# Patient Record
Sex: FEMALE | URBAN - METROPOLITAN AREA
[De-identification: names, ages, dates, MRNs, and addresses within clinical notes are randomized per-mention and may not be internally consistent; named-entity substitution may affect disease eponyms.]

---

## 2018-04-18 ENCOUNTER — HOSPITAL ENCOUNTER (INPATIENT)
Facility: HOSPITAL | Age: 38
LOS: 8 days | Discharge: HOME | DRG: 885 | End: 2018-04-26
Attending: PSYCHIATRY & NEUROLOGY | Admitting: PSYCHIATRY & NEUROLOGY

## 2018-04-18 DIAGNOSIS — F25.0 SCHIZOAFFECTIVE DISORDER, BIPOLAR TYPE: Primary | ICD-10-CM

## 2018-04-18 DIAGNOSIS — R45.851 DEPRESSION WITH SUICIDAL IDEATION: ICD-10-CM

## 2018-04-18 DIAGNOSIS — F32.A DEPRESSION WITH SUICIDAL IDEATION: ICD-10-CM

## 2018-04-18 PROCEDURE — 11400000 HC PSYCH PRIVATE ROOM

## 2018-04-18 PROCEDURE — 25000003 PHARM REV CODE 250: Performed by: PSYCHIATRY & NEUROLOGY

## 2018-04-18 RX ORDER — HYDROXYZINE PAMOATE 50 MG/1
50 CAPSULE ORAL NIGHTLY PRN
Status: DISCONTINUED | OUTPATIENT
Start: 2018-04-18 | End: 2018-04-22

## 2018-04-18 RX ORDER — MAG HYDROX/ALUMINUM HYD/SIMETH 200-200-20
30 SUSPENSION, ORAL (FINAL DOSE FORM) ORAL EVERY 6 HOURS PRN
Status: DISCONTINUED | OUTPATIENT
Start: 2018-04-18 | End: 2018-04-26 | Stop reason: HOSPADM

## 2018-04-18 RX ORDER — FOLIC ACID 1 MG/1
1 TABLET ORAL DAILY
Status: DISCONTINUED | OUTPATIENT
Start: 2018-04-18 | End: 2018-04-26 | Stop reason: HOSPADM

## 2018-04-18 RX ORDER — LOPERAMIDE HYDROCHLORIDE 2 MG/1
2 CAPSULE ORAL
Status: DISCONTINUED | OUTPATIENT
Start: 2018-04-18 | End: 2018-04-26 | Stop reason: HOSPADM

## 2018-04-18 RX ORDER — ACETAMINOPHEN 325 MG/1
650 TABLET ORAL EVERY 6 HOURS PRN
Status: DISCONTINUED | OUTPATIENT
Start: 2018-04-18 | End: 2018-04-26 | Stop reason: HOSPADM

## 2018-04-18 RX ORDER — OLANZAPINE 10 MG/1
10 TABLET ORAL EVERY 8 HOURS PRN
Status: DISCONTINUED | OUTPATIENT
Start: 2018-04-18 | End: 2018-04-26 | Stop reason: HOSPADM

## 2018-04-18 RX ORDER — DOCUSATE SODIUM 100 MG/1
100 CAPSULE, LIQUID FILLED ORAL DAILY PRN
Status: DISCONTINUED | OUTPATIENT
Start: 2018-04-18 | End: 2018-04-26 | Stop reason: HOSPADM

## 2018-04-18 RX ORDER — OLANZAPINE 10 MG/2ML
10 INJECTION, POWDER, FOR SOLUTION INTRAMUSCULAR EVERY 8 HOURS PRN
Status: DISCONTINUED | OUTPATIENT
Start: 2018-04-18 | End: 2018-04-26 | Stop reason: HOSPADM

## 2018-04-18 RX ADMIN — THERA TABS 1 TABLET: TAB at 02:04

## 2018-04-18 RX ADMIN — FOLIC ACID 1 MG: 1 TABLET ORAL at 02:04

## 2018-04-18 RX ADMIN — HYDROXYZINE PAMOATE 50 MG: 50 CAPSULE ORAL at 09:04

## 2018-04-18 NOTE — PLAN OF CARE
"Problem: Overarching Goals (Adult)  Goal: Develops/Participates in Therapeutic [place] to Support Successful Transition    Intervention: Mutually Develop Transition Plan  Collateral Contact:    Spoke with patient's sister Leigh Keyes [phone]. She states patient came down came down from [place] where been with their mom. "Somehow she ended up in [place] (she does have a daughtert in [place]) , then went to [place]; and told me to come get her. All this traveling she's doing, she thinks someone is after her.    "She's like two people - she will be calm and then switch on you (usually when you dont respond the way she wants you to or agree with her).   Sister states she gets up for work at 5am - patient got up and started fussing. "She woke up hostile, it's like she's got a split personality. She just went to fussing. She's gotten way smaller than she usually is. I dont think she's eating much, but she did eat when she was here.      Normally you can have a conversation w her ; now she rambles through conversaitons; never finishes  what she was saying- and just switches on you.     We do have schizophrenia in our family. We have another sister with schizophrenia but she's-more calm and relaxed, she mostly eats and sleeps with a blank expression. She's doing better now- she can hold a conversation. At one time she thought she was Luiza (a peralta) , but we're past that. I also have an aunt who is totally out of it.     Sister states patient has been hospitalized before in [place]. I got my mom to take a picture of the bottle of medication. It was Latuda 80mg, but dated June 2 2017. My mom states hasnt been taking it.     Sister states patient got mad and left her house to go to the [facility]ure. Sister states last night she went to the [employer]  [facility] around 9pm and everything seemed ok with her. She was going to go to her cousin in [place]. She walked me back to the car. Now today this. She got picked up " by ambulance. I Think shes been having withdrawals of medicine. I  visited mom last April/may- she was saying not go outside someone would kidnap me and never see me again. It's been about a a year; seeing same traits with other sister and her.    She has kids she needs to look after. And that's another problem. Her kids dad took them and she doesn't know where they are and hasn't talked to their dad;   Sister state patient does smoke marijuana, but she doesn't know of anything else states patient left her mom at the beigining March.    Sister states patient is very paranoid and asked that we DO NOT not tell her we spoke.
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
## 2018-04-19 LAB
BACTERIA #/AREA URNS HPF: ABNORMAL /HPF
BILIRUB UR QL STRIP: NEGATIVE
CHOLEST SERPL-MCNC: 144 MG/DL
CHOLEST/HDLC SERPL: 2.7 {RATIO}
CLARITY UR: CLEAR
COLOR UR: YELLOW
GLUCOSE SERPL-MCNC: 84 MG/DL
GLUCOSE UR QL STRIP: NEGATIVE
HDLC SERPL-MCNC: 54 MG/DL
HDLC SERPL: 37.5 %
HGB UR QL STRIP: ABNORMAL
KETONES UR QL STRIP: NEGATIVE
LDLC SERPL CALC-MCNC: 79.2 MG/DL
LEUKOCYTE ESTERASE UR QL STRIP: ABNORMAL
MICROSCOPIC COMMENT: ABNORMAL
NITRITE UR QL STRIP: NEGATIVE
NONHDLC SERPL-MCNC: 90 MG/DL
PH UR STRIP: 6 [PH] (ref 5–8)
PROT UR QL STRIP: NEGATIVE
RBC #/AREA URNS HPF: 1 /HPF (ref 0–4)
SP GR UR STRIP: 1.02 (ref 1–1.03)
T4 FREE SERPL-MCNC: 0.99 NG/DL
TRIGL SERPL-MCNC: 54 MG/DL
TSH SERPL DL<=0.005 MIU/L-ACNC: 0.33 UIU/ML
URN SPEC COLLECT METH UR: ABNORMAL
UROBILINOGEN UR STRIP-ACNC: NEGATIVE EU/DL
WBC #/AREA URNS HPF: 4 /HPF (ref 0–5)

## 2018-04-19 PROCEDURE — 84439 ASSAY OF FREE THYROXINE: CPT

## 2018-04-19 PROCEDURE — 80061 LIPID PANEL: CPT

## 2018-04-19 PROCEDURE — 25000003 PHARM REV CODE 250: Performed by: PSYCHIATRY & NEUROLOGY

## 2018-04-19 PROCEDURE — 99223 1ST HOSP IP/OBS HIGH 75: CPT | Mod: ,,, | Performed by: PSYCHIATRY & NEUROLOGY

## 2018-04-19 PROCEDURE — 36415 COLL VENOUS BLD VENIPUNCTURE: CPT

## 2018-04-19 PROCEDURE — 11400000 HC PSYCH PRIVATE ROOM

## 2018-04-19 PROCEDURE — 81000 URINALYSIS NONAUTO W/SCOPE: CPT

## 2018-04-19 PROCEDURE — 99252 IP/OBS CONSLTJ NEW/EST SF 35: CPT | Mod: ,,, | Performed by: NURSE PRACTITIONER

## 2018-04-19 PROCEDURE — 84443 ASSAY THYROID STIM HORMONE: CPT

## 2018-04-19 PROCEDURE — 90833 PSYTX W PT W E/M 30 MIN: CPT | Mod: ,,, | Performed by: PSYCHIATRY & NEUROLOGY

## 2018-04-19 PROCEDURE — 82947 ASSAY GLUCOSE BLOOD QUANT: CPT

## 2018-04-19 RX ORDER — OLANZAPINE 10 MG/1
10 TABLET, ORALLY DISINTEGRATING ORAL NIGHTLY
Status: DISCONTINUED | OUTPATIENT
Start: 2018-04-19 | End: 2018-04-21

## 2018-04-19 RX ORDER — OLANZAPINE 5 MG/1
5 TABLET, ORALLY DISINTEGRATING ORAL DAILY
Status: DISCONTINUED | OUTPATIENT
Start: 2018-04-19 | End: 2018-04-21

## 2018-04-19 RX ADMIN — THERA TABS 1 TABLET: TAB at 08:04

## 2018-04-19 RX ADMIN — OLANZAPINE 10 MG: 10 TABLET, ORALLY DISINTEGRATING ORAL at 09:04

## 2018-04-19 RX ADMIN — HYDROXYZINE PAMOATE 50 MG: 50 CAPSULE ORAL at 10:04

## 2018-04-19 RX ADMIN — OLANZAPINE 5 MG: 5 TABLET, ORALLY DISINTEGRATING ORAL at 10:04

## 2018-04-19 RX ADMIN — FOLIC ACID 1 MG: 1 TABLET ORAL at 08:04
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
## 2018-04-19 NOTE — PLAN OF CARE
Problem: Patient Care Overview (Adult)  Goal: Plan of Care Review  Outcome: Ongoing (interventions implemented as appropriate)  Pt.  Slept 2  Hours  this shift. Pt. Verbalized having difficult time falling asleep even after taking Vistaril. Pt. Stated she would speak tot he doctor regarding her insomnia and did not wasn't additional med during the night. q 15 min safety checks done this shift. Safety and comfort maintained. Cont. Plan.

## 2018-04-19 NOTE — MEDICAL/APP STUDENT
PSYCHIATRY INPATIENT ADMISSION NOTE - H & P      4/19/2018 7:14 AM   Judy Keyes   1980   [id number]           DATE OF ADMISSION: 4/18/2018 12:54 PM    SITE: [facility]    CURRENT LEGAL STATUS: PEC and/or CEC      HISTORY    CHIEF COMPLAINT   Judy Keyes is a 37 y.o. female with a past psychiatric history of depression, anxiety, bipolar, schizophrenia, currently admitted to the inpatient unit with the following chief complaint: suicidal ideation. She has been seeing Dr. Diaz [place], who had prescribed her Latuda, 80 mg, last time she took any medications was a year ago, used to help her sleep at night. She didn't sleep well at all last night, had problems initiating and maintaining sleep, couldn't quantify.     Patient said yes to every question and states that she will say yes to any further questions. She was agitated and very distressed about her two kids, 2 y.o. And 4 y.o., who have been taken by her boyfriend who lives in [place], [place]. She wants some help in order to contact him or someone to make sure her kids are alright. She doesn't know his address or have any contact information but his full name is Seina Rosales. She's been homeless and seeks going to shelter after d/c. Can't give me any details on how long she's been unemployed and homeless.     [facility] [place] senia jess. Called suicide.     HPI   (Elements: Location, Quality, Severity, Duration, Timing, Content, Modifying Factors, Associated Signs & Symptoms)    The patient was seen and examined. The chart was reviewed.    The patient presented to the ER on *** with complaints of ***    The patient was medically cleared and admitted to the [facility].     ***       Symptoms of Depression: diminished mood or loss of interest/anhedonia; +irritability, +diminished energy, change in sleep, change in appetite, +diminished concentration or cognition or indecisiveness, PMA/R, +excessive guilt or hopelessness or  worthlessness, suicidal ideations    Sleep: initiation, maintenance, early morning awakening with inability to return to sleep    Suicidal/Homicidal ideations: active/passive ideations, organized plans, future intentions    Symptoms of psychosis: hallucinations, delusions, disorganized thinking, disorganized behavior or abnormal motor behavior, or negative symptoms (diminshed emotional expression, avolition, anhedonia, alogia, asociality      Symptoms of saran or hypomania: elevated, expansive, or irritable mood with increased energy or activity; with inflated self-esteem or grandiosity, decreased need for sleep, increased rate of speech, FOI or racing thoughts, distractibility, increased goal directed activity or PMA, risky/disinhibited behavior    Symptoms of KAYLIE: excessive anxiety/worry/fear, more days than not, about numerous issues, difficult to control, with restlessness, fatigue, poor concentration, irritability, muscle tension, sleep disturbance; causes functionally impairing distress     Symptoms of Panic Disorder: recurrent panic attacks, precipitated or un-precipitated, source of worry and/or behavioral changes secondary; with or without agoraphobia    Symptoms of PTSD: h/o trauma; re-experiencing/intrusive symptoms, avoidant behavior, negative alterations in cognition or mood, or hyperarousal symptoms; with or without dissociative symptoms     Symptoms of OCD: obsessions or compulsions     Symptoms of Eating Disorders: anorexia, bulimia or binging    Substance Use: intoxication, withdrawal, tolerance, used in larger amounts or duration than intended, unsuccessful attempts to limit or quit, increased time engaging in or seeking out, cravings or strong desire to use, failure to fulfill obligations, negative consequences in social/interpersonal/occupational,/recreational areas, use in dangerous situations, medical or psychological consequences         PSYCHOTHERAPY ADD-ON +[id number]   30 (16-37*)  minutes    Time: *** minutes  Participants: Met with patient    Therapeutic Intervention Type: behavior modifying psychotherapy, supportive psychotherapy  Why chosen therapy is appropriate versus another modality: relevant to diagnosis, patient responds to this modality, evidence based practice    Target symptoms: {PSY TARGET SYMPTOMS:35881}  Primary focus: ***  Psychotherapeutic techniques: ***    Outcome monitoring methods: self-report, observation    Patient's response to intervention:  The patient's response to intervention is {response:22719}.    Progress toward goals:  The patient's progress toward goals is {progress:47610}.            PAST PSYCHIATRIC HISTORY  Previous Psychiatric Hospitalizations: 2-3   Previous SI/HI: ***  Previous Suicide Attempts: ***   Previous Medication Trials: ***  Psychiatric Care (current & past): ***  History of Psychotherapy: ***  History of Violence: ***      SUBSTANCE ABUSE HISTORY   Tobacco: ***  Alcohol: ***  Illicit Substances: ***  Misuse of Prescription Medications: ***  Detoxes: ***  Rehabs: ***  12 Step Meetings: ***  Periods of Sobriety: ***  Withdrawal: ***        PAST MEDICAL & SURGICAL HISTORY   Past Medical History:   Diagnosis Date    Anxiety     Depression     History of psychiatric hospitalization     Hx of psychiatric care     Psychiatric problem     Therapy      No past surgical history on file.  ***    CURRENT MEDICATION REGIMEN   Home Meds:   Prior to Admission medications    Not on File       ***  OTC Meds: ***    Scheduled Meds:    folic acid  1 mg Oral Daily    multivitamin  1 tablet Oral Daily      PRN Meds: acetaminophen, aluminum-magnesium hydroxide-simethicone, docusate sodium, hydrOXYzine pamoate, loperamide, OLANZapine **AND** OLANZapine   Psychotherapeutics     Start     Stop Route Frequency Ordered    04/18/18 1306  OLANZapine tablet 10 mg  (Olanzapine)      -- Oral Every 8 hours PRN 04/18/18 1306    04/18/18 1306  OLANZapine injection 10 mg  " (Olanzapine)      -- IM Every 8 hours PRN 04/18/18 1306            ALLERGIES   Review of patient's allergies indicates:  No Known Allergies      NEUROLOGIC HISTORY  Seizures: ***   Head trauma: ***       FAMILY PSYCHIATRIC HISTORY   History reviewed. No pertinent family history.    ***       SOCIAL HISTORY  Developmental/Childhood: ***  History of Physical/Sexual Abuse: ***  Education: ***    Employment: ***   Financial: ***   Relationship Status/Sexual Orientation: ***   Children: ***   Housing Status: ***    Amish: ***   History: ***   Recreational Activities: ***  Access to Gun: ***       LEGAL HISTORY   Past Charges/Incarcerations:***   Pending Charges: ***      ROS  Reviewed note/exam by  *** from *** at ***        EXAMINATION      PHYSICAL EXAM  Reviewed note/exam by  *** from *** at ***    VITALS   Vitals:    04/18/18 2100   BP: 97/65   Pulse: 65   Resp: 19   Temp: 98.7 °F (37.1 °C)          PAIN  ***/10  Subjective report of pain matches objective signs and symptoms: {YES,NO, WILDCARD(MULTI):00796}      LABORATORY DATA   No results found for this or any previous visit (from the past 72 hour(s)).   No results found for: PHENYTOIN, PHENOBARB, VALPROATE, CBMZ        CONSTITUTIONAL  General Appearance: ***; NAD    MUSCULOSKELETAL  Muscle Strength and Tone: *** normal  Abnormal Involuntary Movements: *** none  Gait and Station: *** normal; non-ataxic    PSYCHIATRIC   Level of Consciousness: awake, alert  Orientation: p/p/t/s  Grooming: *** adequate to circumstances  Psychomotor Behavior: *** PMA/R  Speech: nl r/t/v/s  Language: *** English fluent  Mood: "***"  Affect: ***  Thought Process: *** linear and organized  Associations: *** intact; no CHELLY  Thought Content: *** denied AVH/delusions; denied HI/SI  Memory: *** intact to recent and remote events  Attention: *** intact to conversation; not distractible   Fund of Knowledge: *** age and education appropriate  Estimate if Intelligence: *** " average based on work/education history, vocabulary and mental status exam  Insight: *** good- seeks help  Judgment:  *** good- no bx issues, compliant and cooperative        PSYCHOSOCIAL      PSYCHOSOCIAL STRESSORS   { :74764}    FUNCTIONING RELATIONSHIPS   {Psychfxinrelationships:88689}      STRENGTHS AND LIABILITIES   {PSY STRENGTHS/LIABILITIES:57598}      Is the patient aware of the biomedical complications associated with substance abuse and mental illness? yes    Does the patient have an Advance Directive for Mental Health treatment? no  (If yes, inform patient to bring copy.)        ASSESSMENT     IMPRESSION   ***          MEDICAL DECISION MAKING        PROBLEM LIST AND MANAGEMENT PLANS    ***      PRESCRIPTION DRUG MANAGEMENT  Compliance: yes  Side Effects: no  Regimen Adjustments:   ***      DIAGNOSTIC TESTING  Labs reviewed; follow up pending labs; ***    Disposition:  - to assist with aftercare planning and collateral  -Once stable discharge home with outpatient follow up care and/or rehab  -Continue inpatient treatment under a PEC and/or CEC for danger to self, and danger to others, and grave disability as evident by ***      Bre Whipple MD  Psychiatry
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
## 2018-04-19 NOTE — PLAN OF CARE
Problem: Patient Care Overview (Adult)  Goal: Plan of Care Review  Outcome: Ongoing (interventions implemented as appropriate)  Pt anxious, cooperative, worried about her kids, denies SI or HI, safety precautions maintained will continue to monitor

## 2018-04-19 NOTE — PLAN OF CARE
"  Treatment Recommendation:   1:1 Intervention (as needed)    Reality Orientation Skilled Activity  Cognitive Stimulation Skilled Activity  Sensory Stimulation Skilled Activity  Creative Expression Skilled Activity  Self Expression Skilled Activity  Mild Exercises Skilled Activity  Stress Management Skilled Activity  Coping Skilled Activity  Leisure Education and Awareness Skilled Activity    Treatment Goal(s):  Long Term Goals Refer To Master Treatment Plan    Short Term Treatment Goal(s)  Patient Will:  Exhibit Improvement in Mood  Demonstrate Improvement in Thought Processes / Awareness  Demonstrate Constructive Expression of Feelings and Behavior  Identify at Least 2 Coping Skills or Leisure Skills to Reduce Depression and Hopelessness Upon Request from Therapist  Identify (+) Leisure / Recreation Activities that Promote Wellness and Promote a Sober Lifestyle    Discharge Recommendations:  Encourage Patient to Utilize Coping Skills on a Regular Basis to Reduce the Risk of Decompensating and Re-Hospitalizations  Follow Up with After Care Appointments  Continue with Current Leisure Activities     Patient presents with sleepy, irritable affect and "depressed, sleepy, tired" mood. Patient states "I don't like being around people because everything is recorded." Patient states her admit is due to "going through a stressful time in my life. Suicidal thoughts, depressed, sad. My head cloudy right now, life is complicated right now. Everything is wrong. I don't know which way to turn." Patient admits to negative leisure lifestyle of alcohol, marijuana and cigarettes. Patient reports she is single, has 6 children, GED/some college, unemployed, homeless(supported by family).   "
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
## 2018-04-20 PROCEDURE — 99233 SBSQ HOSP IP/OBS HIGH 50: CPT | Mod: ,,, | Performed by: PSYCHIATRY & NEUROLOGY

## 2018-04-20 PROCEDURE — 11400000 HC PSYCH PRIVATE ROOM

## 2018-04-20 PROCEDURE — 25000003 PHARM REV CODE 250: Performed by: PSYCHIATRY & NEUROLOGY

## 2018-04-20 RX ADMIN — OLANZAPINE 10 MG: 10 TABLET, ORALLY DISINTEGRATING ORAL at 08:04

## 2018-04-20 RX ADMIN — OLANZAPINE 5 MG: 5 TABLET, ORALLY DISINTEGRATING ORAL at 08:04

## 2018-04-20 RX ADMIN — FOLIC ACID 1 MG: 1 TABLET ORAL at 08:04

## 2018-04-20 RX ADMIN — HYDROXYZINE PAMOATE 50 MG: 50 CAPSULE ORAL at 09:04

## 2018-04-20 RX ADMIN — THERA TABS 1 TABLET: TAB at 08:04

## 2018-04-20 NOTE — PLAN OF CARE
Problem: Overarching Goals (Adult)  Goal: Optimized Coping Skills in Response to Life Stressors    Intervention: Promote Effective Coping Strategies  Group Note  Behavior:  Patient attended Psychotherapy Group and participated.     Intervention:  Resilience- patients answered True/False questions regarding resilience indicators. Discussed answers. Discussed definition of resilience, how they have dealt with stressful events and what they learned from that experience. Patient noted what makes them feel more hopeful.     Response:  Patient states she thinks he is resilient but has work to do.  Notes her stressful events have including childbirth, finding a job and halfway time.      Plan:  Will continue to encourage patient participation in group

## 2018-04-20 NOTE — PLAN OF CARE
"Problem: Overarching Goals (Adult)  Goal: Develops/Participates in Therapeutic [place] to Support Successful Transition    Intervention: Foster Therapeutic [place]  Patient declined PSA. Patient stated, "this process has been aggravating. I keep getting asked the same questions, and it doesn't seem to be going anywhere". Patient continued, "I don't want to answer any more questions, and I don't want to sign any papers. I am ready to go". "I am getting really aggravated up in here. How do I leave?"    Patient refused to sign for collateral.    Patient refused to sign safety plan.          "

## 2018-04-20 NOTE — PLAN OF CARE
Problem: Patient Care Overview (Adult)  Goal: Plan of Care Review  Outcome: Ongoing (interventions implemented as appropriate)  Shift note : patient states that she slept well last night and feels better . Difficult to arrange a follow up appointment as she keeps changing the town that she will be in . She is eating all meals and taking all ordered medications .
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
## 2018-04-20 NOTE — PROGRESS NOTES
"PSYCHIATRY DAILY INPATIENT PROGRESS NOTE  SUBSEQUENT HOSPITAL VISIT    ENCOUNTER DATE: 4/20/2018  SITE: [facility]    DATE OF ADMISSION: 4/18/2018 12:54 PM  LENGTH OF STAY: 2 days      HISTORY    CHIEF COMPLAINT   Judy Keyes is a 37 y.o. female, seen during daily sainz rounds on the inpatient unit.  Judy Keyes presents with the chief complaint of psychosis and saran    HPI   (Elements: Location, Quality, Severity, Duration, Timing, Content, Modifying Factors, Associated Signs & Symptoms)    The patient was seen and examined. The chart was reviewed.    Staff reports no behavioral or management issues.     The patient has been compliant with treatment. The patient denied any side effects.    Patient says the medicine is helping her think "clearer.  I dont know, the medicine just helped me."  She is fixated on finding her labs from the previous hospital which I reviewed yesterday.  She is much less manic today, much improved.  She spoke with her family members yesterday.      Improving Symptoms of Depression: less diminished mood or loss of interest/anhedonia; +irritability, +diminished energy, change in sleep, change in appetite, diminished concentration or cognition or indecisiveness, PMA/R, excessive guilt or hopelessness or worthlessness, suicidal ideations     Endorses Sleep: initiation, maintenance, early morning awakening with inability to return to sleep     For weeks she has not needed sleep.       Improving less Suicidal/Homicidal ideations: less active/passive ideations, organized plans, future intentions     She finds the medicine has helped with these thoughts     Continued but improving Symptoms of psychosis: +hallucinations, +delusions, somewhat disorganized thinking, disorganized behavior or abnormal motor behavior, or negative symptoms (diminshed emotional expression, avolition, anhedonia, alogia, asociality     Medicine making hallucinations less frequent and less intense.  She is " guarded and wont disclose what voices she hears.       Improving Symptoms of saran or hypomania: less elevated, less expansive, or irritable mood with increased energy or activity; less with inflated self-esteem or grandiosity, improving decreased need for sleep, +increased rate of speech, improving FOI or racing thoughts, improving distractibility, less increased goal directed activity or PMA, less risky/disinhibited behavior     Continued but improving Symptoms of KAYLIE: less excessive anxiety/worry/fear, more days than not, about numerous issues, difficult to control, with restlessness, fatigue, poor concentration, irritability, muscle tension, sleep disturbance; causes functionally impairing distress     ROS  General ROS: negative  Ophthalmic ROS: negative  ENT ROS: negative  Allergy and Immunology ROS: negative  Hematological and Lymphatic ROS: negative  Endocrine ROS: negative  Respiratory ROS: no cough, shortness of breath, or wheezing  Cardiovascular ROS: no chest pain or dyspnea on exertion  Gastrointestinal ROS: no abdominal pain, change in bowel habits, or black or bloody stools  Genito-Urinary ROS: no dysuria, trouble voiding, or hematuria  Musculoskeletal ROS: negative  Neurological ROS: no TIA or stroke symptoms  Dermatological ROS: negative    PAST MEDICAL HISTORY   Past Medical History:   Diagnosis Date    Anxiety     Depression     History of psychiatric hospitalization     Hx of psychiatric care     Psychiatric problem     Therapy            PSYCHOTROPIC MEDICATIONS   Scheduled Meds:   folic acid  1 mg Oral Daily    multivitamin  1 tablet Oral Daily    olanzapine zydis  10 mg Oral QHS    olanzapine zydis  5 mg Oral Daily     Continuous Infusions:  PRN Meds:.acetaminophen, aluminum-magnesium hydroxide-simethicone, docusate sodium, hydrOXYzine pamoate, loperamide, OLANZapine **AND** OLANZapine        EXAMINATION    VITALS   Vitals:    04/19/18 2106   BP: 121/66   Pulse: 77   Resp: 18   Temp:  "96.8 °F (36 °C)       CONSTITUTIONAL  General Appearance: NAD    NEUROLOGIC  Abnormal Involuntary Movements: no rigidity or spasticity  Gait and Station: normal non -ataxic    PSYCHIATRIC   Level of Consciousness: alert  Orientation: p/p/t/s  Grooming: in hospital gown, somewhat disheveled  Psychomotor Behavior: no PMA PMR  Speech: spontaneous r/r/r  Mood: "better"  Affect: pleasant, less elevated, calmer  Thought Process: less racing, more organized  Thought Content: less delusions and AH  Memory: intact to conversation  Attention: less distractible   Insight: intact - seeks and accepts care, recognizes mental illness improving  Judgment: intact - cooperative with care, no behavioral issues        DIAGNOSTIC TESTING   Laboratory Results  Recent Results (from the past 24 hour(s))   Urinalysis    Collection Time: 04/19/18  3:10 PM   Result Value Ref Range    Specimen UA Urine, Clean Catch     Color, UA Yellow Yellow, Straw, Ana M    Appearance, UA Clear Clear    pH, UA 6.0 5.0 - 8.0    Specific Gravity, UA 1.020 1.005 - 1.030    Protein, UA Negative Negative    Glucose, UA Negative Negative    Ketones, UA Negative Negative    Bilirubin (UA) Negative Negative    Occult Blood UA Trace (A) Negative    Nitrite, UA Negative Negative    Urobilinogen, UA Negative <2.0 EU/dL    Leukocytes, UA Trace (A) Negative   Urinalysis Microscopic    Collection Time: 04/19/18  3:10 PM   Result Value Ref Range    RBC, UA 1 0 - 4 /hpf    WBC, UA 4 0 - 5 /hpf    Bacteria, UA Many (A) None-Occ /hpf    Microscopic Comment SEE COMMENT          MEDICAL DECISION MAKING    DIAGNOSES  Schzoaffective Disorder bipolar type  KAYLIE  Nicotine Use Disorder  Marijuana Use disorder  Psychosocial stressors    PROBLEM LIST AND MANAGEMENT PLANS  Psychosis - zyprexa, counseling  Mood - zyprexa, counsleing  Anxiety - counseling (will consider clonazepam as adjunct treatment for saran and anxiety)  Marijuana - counseling   Nicotine Use - replacement and " counseling     Psychosocial stressors - obtain collateral for better idea of her child custody situation and proper disposition to possibly [place]    PRESCRIPTION DRUG MANAGEMENT  Compliance: yes  Side Effects: no  Regimen Adjustments:      4/19  Zyprexa 5mg Qday 10mgQHS    DISCHARGE PLANNING  -SW to assist with aftercare planning and collateral  -Once stable discharge home with outpatient follow up care and/or rehab  -Continue inpatient treatment under a PEC and/or CEC for danger to self, and danger to others, and grave disability as evident by saran and psychosis.      NEED FOR CONTINUED HOSPITALIZATION  Psychiatric illness continues to pose a potential threat to life or bodily function, of self or others, thereby requiring the need for continued inpatient psychiatric hospitalization: Yes    Protective inpatient pyschiatric hospitalization required while a safe disposition plan is enacted: Yes    Patient stabilized and ready for discharge from inpatient psychiatric unit: No      STAFF:   Jarret Quiroz MD  Psychiatry

## 2018-04-21 PROCEDURE — 99233 SBSQ HOSP IP/OBS HIGH 50: CPT | Mod: ,,, | Performed by: PSYCHIATRY & NEUROLOGY

## 2018-04-21 PROCEDURE — 25000003 PHARM REV CODE 250: Performed by: PSYCHIATRY & NEUROLOGY

## 2018-04-21 PROCEDURE — 11400000 HC PSYCH PRIVATE ROOM

## 2018-04-21 RX ORDER — DIVALPROEX SODIUM 500 MG/1
500 TABLET, FILM COATED, EXTENDED RELEASE ORAL 2 TIMES DAILY
Status: DISCONTINUED | OUTPATIENT
Start: 2018-04-21 | End: 2018-04-26 | Stop reason: HOSPADM

## 2018-04-21 RX ORDER — CLONAZEPAM 1 MG/1
1 TABLET ORAL 2 TIMES DAILY
Status: DISCONTINUED | OUTPATIENT
Start: 2018-04-21 | End: 2018-04-22

## 2018-04-21 RX ORDER — OLANZAPINE 10 MG/1
10 TABLET, ORALLY DISINTEGRATING ORAL DAILY
Status: DISCONTINUED | OUTPATIENT
Start: 2018-04-22 | End: 2018-04-22

## 2018-04-21 RX ADMIN — THERA TABS 1 TABLET: TAB at 08:04

## 2018-04-21 RX ADMIN — OLANZAPINE 15 MG: 10 TABLET, ORALLY DISINTEGRATING ORAL at 08:04

## 2018-04-21 RX ADMIN — DIVALPROEX SODIUM 500 MG: 500 TABLET, EXTENDED RELEASE ORAL at 11:04

## 2018-04-21 RX ADMIN — CLONAZEPAM 1 MG: 1 TABLET ORAL at 09:04

## 2018-04-21 RX ADMIN — OLANZAPINE 5 MG: 5 TABLET, ORALLY DISINTEGRATING ORAL at 08:04

## 2018-04-21 RX ADMIN — FOLIC ACID 1 MG: 1 TABLET ORAL at 08:04

## 2018-04-21 RX ADMIN — DIVALPROEX SODIUM 500 MG: 500 TABLET, EXTENDED RELEASE ORAL at 08:04

## 2018-04-21 RX ADMIN — CLONAZEPAM 1 MG: 1 TABLET ORAL at 08:04

## 2018-04-21 NOTE — PROGRESS NOTES
PSYCHIATRY DAILY INPATIENT PROGRESS NOTE  SUBSEQUENT HOSPITAL VISIT    ENCOUNTER DATE: 4/21/2018  [facility]    DATE OF ADMISSION: 4/18/2018 12:54 PM  LENGTH OF STAY: 3 days      HISTORY    CHIEF COMPLAINT   Judy Keyes is a 37 y.o. female, seen during daily sainz rounds on the inpatient unit.  Judy Keyes presents with the chief complaint of psychosis and saran    HPI   (Elements: Location, Quality, Severity, Duration, Timing, Content, Modifying Factors, Associated Signs & Symptoms)    The patient was seen and examined. The chart was reviewed.    Staff reports she is intrusive and angry    The patient has been compliant with treatment. The patient denied any side effects.    Patient did not sleep well last night.  She has been taking her medication.  She is elevated and irritable, speech is rapid.  She mentioned     Improving Symptoms of Depression: less diminished mood or loss of interest/anhedonia; +irritability, +diminished energy, change in sleep, change in appetite, diminished concentration or cognition or indecisiveness, PMA/R, excessive guilt or hopelessness or worthlessness, suicidal ideations     Endorses Sleep: initiation, maintenance, early morning awakening with inability to return to sleep     For weeks she has not needed sleep.       Improving less Suicidal/Homicidal ideations: less active/passive ideations, organized plans, future intentions     She finds the medicine has helped with these thoughts     Continued but improving Symptoms of psychosis: +hallucinations, +delusions, somewhat disorganized thinking, disorganized behavior or abnormal motor behavior, or negative symptoms (diminshed emotional expression, avolition, anhedonia, alogia, asociality     Medicine making hallucinations less frequent and less intense.  She is guarded and wont disclose what voices she hears.       Improving but continued Symptoms of saran or hypomania: less elevated, less expansive, or irritable mood  with increased energy or activity; less with inflated self-esteem or grandiosity, improving decreased need for sleep, +increased rate of speech, improving FOI or racing thoughts, improving distractibility, less increased goal directed activity or PMA, less risky/disinhibited behavior     Continued but improving Symptoms of KAYLIE: less excessive anxiety/worry/fear, more days than not, about numerous issues, difficult to control, with restlessness, fatigue, poor concentration, irritability, muscle tension, sleep disturbance; causes functionally impairing distress     ROS  General ROS: negative  Ophthalmic ROS: negative  ENT ROS: negative  Allergy and Immunology ROS: negative  Hematological and Lymphatic ROS: negative  Endocrine ROS: negative  Respiratory ROS: no cough, shortness of breath, or wheezing  Cardiovascular ROS: no chest pain or dyspnea on exertion  Gastrointestinal ROS: no abdominal pain, change in bowel habits, or black or bloody stools  Genito-Urinary ROS: no dysuria, trouble voiding, or hematuria  Musculoskeletal ROS: negative  Neurological ROS: no TIA or stroke symptoms  Dermatological ROS: negative    PAST MEDICAL HISTORY   Past Medical History:   Diagnosis Date    Anxiety     Depression     History of psychiatric hospitalization     Hx of psychiatric care     Psychiatric problem     Therapy            PSYCHOTROPIC MEDICATIONS   Scheduled Meds:   clonazePAM  1 mg Oral BID    folic acid  1 mg Oral Daily    multivitamin  1 tablet Oral Daily    [START ON 4/22/2018] olanzapine zydis  10 mg Oral Daily    olanzapine zydis  15 mg Oral QHS     Continuous Infusions:  PRN Meds:.acetaminophen, aluminum-magnesium hydroxide-simethicone, docusate sodium, hydrOXYzine pamoate, loperamide, OLANZapine **AND** OLANZapine        EXAMINATION    VITALS   Vitals:    04/21/18 0834   BP: 126/60   Pulse: 65   Resp: 18   Temp: 98.5 °F (36.9 °C)       CONSTITUTIONAL  General Appearance: NAD    NEUROLOGIC  Abnormal  "Involuntary Movements: no rigidity or spasticity  Gait and Station: normal non -ataxic    PSYCHIATRIC   Level of Consciousness: alert  Orientation: p/p/t/s  Grooming: in hospital gown, somewhat disheveled  Psychomotor Behavior: some PMA no PMR  Speech: spontaneous r/r/r  Mood: "better"  Affect: somewhat agitated, intense  Thought Process: racing,   Thought Content: +delusions and AH  Memory: intact to conversation  Attention: less distractible   Insight: intact - seeks and accepts care, recognizes mental illness improving  Judgment: intact - cooperative with care, no behavioral issues        DIAGNOSTIC TESTING   Laboratory Results  No results found for this or any previous visit (from the past 24 hour(s)).      MEDICAL DECISION MAKING    DIAGNOSES  Schzoaffective Disorder bipolar type  KAYLIE  Nicotine Use Disorder  Marijuana Use disorder  Psychosocial stressors    PROBLEM LIST AND MANAGEMENT PLANS  Psychosis - zyprexa, counseling  Mood - zyprexa, counsleing depakote clonazepam  Anxiety - counseling clonazepam  Marijuana - counseling   Nicotine Use - replacement and counseling     Psychosocial stressors - obtain collateral for better idea of her child custody situation and proper disposition to possibly [place]    PRESCRIPTION DRUG MANAGEMENT  Compliance: yes  Side Effects: no  Regimen Adjustments:      4/19  Zyprexa 5mg Qday 10mgQHS    4/21  Zyprexa 10mg QDay 15mg QHS  Clonazepam 1mg BID  Depakote 500mg BID (discussed pregnancy risk)    DISCHARGE PLANNING  -SW to assist with aftercare planning and collateral  -Once stable discharge home with outpatient follow up care and/or rehab  -Continue inpatient treatment under a PEC and/or CEC for danger to self, and danger to others, and grave disability as evident by saran and psychosis.      NEED FOR CONTINUED HOSPITALIZATION  Psychiatric illness continues to pose a potential threat to life or bodily function, of self or others, thereby requiring the need for continued " inpatient psychiatric hospitalization: Yes    Protective inpatient pyschiatric hospitalization required while a safe disposition plan is enacted: Yes    Patient stabilized and ready for discharge from inpatient psychiatric unit: No      STAFF:   Jarret Quiroz MD  Psychiatry

## 2018-04-21 NOTE — PLAN OF CARE
Problem: Patient Care Overview (Adult)  Goal: Plan of Care Review  Outcome: Ongoing (interventions implemented as appropriate)  Up and about the unit.  Less manic this evening.  Denies SI at this time.  Endorses vague AH but will not say what the voices are saying.  Showered and ate snack this evening.  Interacting appropriately with peers and staff.  Safety checks ongoing.

## 2018-04-21 NOTE — PLAN OF CARE
Problem: Overarching Goals (Adult)  Goal: Develops/Participates in Therapeutic [place] to Support Successful Transition    Intervention: Foster Therapeutic [place]  Group Note:    Behavior:  Pt was not in attendance for group today. She was in the restroom when group was initiated.             Plan:  Encourage continued participation in therapeutic activities.

## 2018-04-21 NOTE — PLAN OF CARE
Problem: Patient Care Overview (Adult)  Goal: Plan of Care Review  Outcome: Ongoing (interventions implemented as appropriate)  Pt paranoid about boyfriend stalking her, ptblaming others, and with preoccupation. Pt denies depression, SI/HI, AH/VH. Pt says she has anxiety thinking about her kids while being here. Pt made several phone calls today and got loud during them, but not as loud as yesterday. Pt was polite and not rude today like yesterday. VSS. Accepting meals and snacks. Medication compliant. Will continue to monitor pt.

## 2018-04-22 PROCEDURE — 99233 SBSQ HOSP IP/OBS HIGH 50: CPT | Mod: ,,, | Performed by: PSYCHIATRY & NEUROLOGY

## 2018-04-22 PROCEDURE — 11400000 HC PSYCH PRIVATE ROOM

## 2018-04-22 PROCEDURE — 25000003 PHARM REV CODE 250: Performed by: PSYCHIATRY & NEUROLOGY

## 2018-04-22 PROCEDURE — S4991 NICOTINE PATCH NONLEGEND: HCPCS | Performed by: PSYCHIATRY & NEUROLOGY

## 2018-04-22 RX ORDER — OLANZAPINE 5 MG/1
5 TABLET, ORALLY DISINTEGRATING ORAL DAILY
Status: DISCONTINUED | OUTPATIENT
Start: 2018-04-23 | End: 2018-04-23

## 2018-04-22 RX ORDER — CLONAZEPAM 0.5 MG/1
0.5 TABLET ORAL DAILY
Status: DISCONTINUED | OUTPATIENT
Start: 2018-04-23 | End: 2018-04-26 | Stop reason: HOSPADM

## 2018-04-22 RX ORDER — HYDROXYZINE PAMOATE 50 MG/1
50 CAPSULE ORAL EVERY 6 HOURS PRN
Status: DISCONTINUED | OUTPATIENT
Start: 2018-04-22 | End: 2018-04-26 | Stop reason: HOSPADM

## 2018-04-22 RX ORDER — IBUPROFEN 200 MG
1 TABLET ORAL DAILY PRN
Status: DISCONTINUED | OUTPATIENT
Start: 2018-04-22 | End: 2018-04-26 | Stop reason: HOSPADM

## 2018-04-22 RX ORDER — CLONAZEPAM 1 MG/1
1 TABLET ORAL NIGHTLY
Status: DISCONTINUED | OUTPATIENT
Start: 2018-04-22 | End: 2018-04-26 | Stop reason: HOSPADM

## 2018-04-22 RX ADMIN — DIVALPROEX SODIUM 500 MG: 500 TABLET, EXTENDED RELEASE ORAL at 08:04

## 2018-04-22 RX ADMIN — OLANZAPINE 10 MG: 10 TABLET, ORALLY DISINTEGRATING ORAL at 08:04

## 2018-04-22 RX ADMIN — CLONAZEPAM 1 MG: 1 TABLET ORAL at 08:04

## 2018-04-22 RX ADMIN — NICOTINE 1 PATCH: 14 PATCH, EXTENDED RELEASE TRANSDERMAL at 05:04

## 2018-04-22 RX ADMIN — HYDROXYZINE PAMOATE 50 MG: 50 CAPSULE ORAL at 08:04

## 2018-04-22 RX ADMIN — THERA TABS 1 TABLET: TAB at 08:04

## 2018-04-22 RX ADMIN — OLANZAPINE 15 MG: 10 TABLET, ORALLY DISINTEGRATING ORAL at 08:04

## 2018-04-22 RX ADMIN — FOLIC ACID 1 MG: 1 TABLET ORAL at 08:04

## 2018-04-22 RX ADMIN — HYDROXYZINE PAMOATE 50 MG: 50 CAPSULE ORAL at 02:04

## 2018-04-22 NOTE — PLAN OF CARE
Problem: Patient Care Overview (Adult)  Goal: Plan of Care Review  Outcome: Ongoing (interventions implemented as appropriate)  Pt in day room most of shift and when she is not she is pacing the halls. Pt restless. Pt frequently seeks out staff and at nurses station. Pt paranoid, denies responsibility, blaming other, preoccupied. Pt states anxiety and was given vistaril which was effective and nicotine patch was given when requested. Pt denies depression, SI/HI, AH/VH. HR elevated and had to be taken manually. Will continue to monitor pt.

## 2018-04-22 NOTE — PLAN OF CARE
Problem: Patient Care Overview (Adult)  Goal: Plan of Care Review  Outcome: Ongoing (interventions implemented as appropriate)  Pt.  Slept 8  Hours  this shift without problems noted. q 15 min safety checks done this shift. Safety and comfort maintained. Cont. Plan.

## 2018-04-22 NOTE — PSYCH
"Pt was heard by staff saying "she wants to kill her boyfriend by stabbing him in the jugular and then pleading insanity". Pt told staff he is holding her 3 kids b/c he wants to be a family now and she wants them back so she can live in [place] with them.   "

## 2018-04-22 NOTE — PLAN OF CARE
"Problem: Overarching Goals (Adult)  Goal: Develops/Participates in Therapeutic [place] to Support Successful Transition    Intervention: Foster Therapeutic [place]  Group Note:    Behavior:  Pt attended group today dressed in hospital scrubs. Mood - improved.             Intervention:  The LMSW facilitated a discussion on Gratitude and the benefits of being grateful. Patients' were encourage to reflect on the things they are thankful for and make a "gratitude list".             Response:  Pt shared she is grateful for the following:  "life, children, jobs, mom food and friends". Pt reported she and her mom have had their differences in the past but "she's still my mom" and I am grateful for her.         Plan:  Encourage continued participation in therapeutic activities.                          "

## 2018-04-22 NOTE — PLAN OF CARE
"Problem: Patient Care Overview (Adult)  Goal: Plan of Care Review  Outcome: Ongoing (interventions implemented as appropriate)  Visible in the milieu.  Spoke on the phone multiple times this evening.  No angry outburst while speaking on the phone. Interacting appropriately with peers.  Less manic, more organized this evening.  Started on depakote and klonopin today.  Pt stated that she is feeling better today and misses her children but she realizes that she needs to get herself "straight" first.  Eating and drinking water adequately.  Safety checks ongoing.      "

## 2018-04-22 NOTE — PROGRESS NOTES
"PSYCHIATRY DAILY INPATIENT PROGRESS NOTE  SUBSEQUENT HOSPITAL VISIT    ENCOUNTER DATE: 4/22/2018  SITE: [facility]    DATE OF ADMISSION: 4/18/2018 12:54 PM  LENGTH OF STAY: 4 days      HISTORY    CHIEF COMPLAINT   Judy Keyes is a 37 y.o. female, seen during daily sainz rounds on the inpatient unit.  Judy Keyes presents with the chief complaint of psychosis and saran    HPI   (Elements: Location, Quality, Severity, Duration, Timing, Content, Modifying Factors, Associated Signs & Symptoms)    The patient was seen and examined. The chart was reviewed.    Staff reports improvement in behavior.      The patient has been compliant with treatment. The patient denied any side effects.    She slept well last night.  She says the medicine is really helping her today.  She is able to see "to the right path".        Improving Symptoms of Depression: less diminished mood or loss of interest/anhedonia; less irritability, improved diminished energy, change in sleep, change in appetite, diminished concentration or cognition or indecisiveness, PMA/R, excessive guilt or hopelessness or worthlessness, suicidal ideations     Improved Sleep: initiation, maintenance, early morning awakening with inability to return to sleep     Significantly improved sleep, slept eight hours last night     Improving less Suicidal/Homicidal ideations: less active/passive ideations, organized plans, future intentions     She finds the medicine has helped with these thoughts     Improving Symptoms of psychosis: less hallucinations, less delusions, somewhat disorganized thinking, disorganized behavior or abnormal motor behavior, or negative symptoms (diminshed emotional expression, avolition, anhedonia, alogia, asociality     Medicine making hallucinations less frequent and less intense.  She is guarded and wont disclose what voices she hears.       Her voices are improving and are positive in nature.      Improving but continued Symptoms of " saran or hypomania: less elevated, less expansive, or irritable mood with increased energy or activity; less with inflated self-esteem or grandiosity, improving decreased need for sleep, improving increased rate of speech, improving FOI or racing thoughts, improving distractibility, less increased goal directed activity or PMA, less risky/disinhibited behavior     Improving Symptoms of KAYLIE: less excessive anxiety/worry/fear, more days than not, about numerous issues, difficult to control, with restlessness, fatigue, poor concentration, irritability, muscle tension, sleep disturbance; causes functionally impairing distress     ROS  General ROS: negative  Ophthalmic ROS: negative  ENT ROS: negative  Allergy and Immunology ROS: negative  Hematological and Lymphatic ROS: negative  Endocrine ROS: negative  Respiratory ROS: no cough, shortness of breath, or wheezing  Cardiovascular ROS: no chest pain or dyspnea on exertion  Gastrointestinal ROS: no abdominal pain, change in bowel habits, or black or bloody stools  Genito-Urinary ROS: no dysuria, trouble voiding, or hematuria  Musculoskeletal ROS: negative  Neurological ROS: no TIA or stroke symptoms  Dermatological ROS: negative    PAST MEDICAL HISTORY   Past Medical History:   Diagnosis Date    Anxiety     Depression     History of psychiatric hospitalization     Hx of psychiatric care     Psychiatric problem     Therapy            PSYCHOTROPIC MEDICATIONS   Scheduled Meds:   clonazePAM  1 mg Oral BID    divalproex ER  500 mg Oral BID    folic acid  1 mg Oral Daily    multivitamin  1 tablet Oral Daily    olanzapine zydis  10 mg Oral Daily    olanzapine zydis  15 mg Oral QHS     Continuous Infusions:  PRN Meds:.acetaminophen, aluminum-magnesium hydroxide-simethicone, docusate sodium, hydrOXYzine pamoate, loperamide, OLANZapine **AND** OLANZapine        EXAMINATION    VITALS   Vitals:    04/22/18 0818   BP: 125/85   Pulse: 76   Resp: 18   Temp: 97.2 °F (36.2  "°C)       CONSTITUTIONAL  General Appearance: NAD    NEUROLOGIC  Abnormal Involuntary Movements: no rigidity or spasticity  Gait and Station: normal non -ataxic    PSYCHIATRIC   Level of Consciousness: awake but somewhat drowsy  Orientation: p/p/t/s  Grooming: in hospital gown, somewhat disheveled  Psychomotor Behavior: no PMA no PMR  Speech: spontaneous r/r/r  Mood: "better"  Affect: sedated  Thought Process: no longer racing   Thought Content: no delusions and AH  Memory: intact to conversation  Attention: not distractible   Insight: intact - seeks and accepts care, recognizes mental illness improving  Judgment: intact - cooperative with care, no behavioral issues        DIAGNOSTIC TESTING   Laboratory Results  No results found for this or any previous visit (from the past 24 hour(s)).      MEDICAL DECISION MAKING    DIAGNOSES  Schzoaffective Disorder bipolar type  KAYLIE  Nicotine Use Disorder  Marijuana Use disorder  Psychosocial stressors    PROBLEM LIST AND MANAGEMENT PLANS  Psychosis - zyprexa, counseling  Mood - zyprexa, counsleing depakote clonazepam  Anxiety - counseling clonazepam  Marijuana - counseling   Nicotine Use - replacement and counseling     Psychosocial stressors - obtain collateral for better idea of her child custody situation and proper disposition to possibly [place]    PRESCRIPTION DRUG MANAGEMENT  Compliance: yes  Side Effects: no  Regimen Adjustments:      4/19  Zyprexa 5mg Qday 10mgQHS    4/21  Zyprexa 10mg QDay 15mg QHS  Clonazepam 1mg BID  Depakote 500mg BID (discussed pregnancy risk)    4/22  Zyprexa 5mg QDay and Clonazepam 0.5mg QDay     DISCHARGE PLANNING  -SW to assist with aftercare planning and collateral  -Once stable discharge home with outpatient follow up care and/or rehab  -Continue inpatient treatment under a PEC and/or CEC for danger to self, and danger to others, and grave disability as evident by saran and psychosis.      NEED FOR CONTINUED HOSPITALIZATION  Psychiatric " illness continues to pose a potential threat to life or bodily function, of self or others, thereby requiring the need for continued inpatient psychiatric hospitalization: Yes    Protective inpatient pyschiatric hospitalization required while a safe disposition plan is enacted: Yes    Patient stabilized and ready for discharge from inpatient psychiatric unit: No      STAFF:   Jarret Quiroz MD  Psychiatry

## 2018-04-23 PROCEDURE — 25000003 PHARM REV CODE 250: Performed by: PSYCHIATRY & NEUROLOGY

## 2018-04-23 PROCEDURE — S4991 NICOTINE PATCH NONLEGEND: HCPCS | Performed by: PSYCHIATRY & NEUROLOGY

## 2018-04-23 PROCEDURE — 99233 SBSQ HOSP IP/OBS HIGH 50: CPT | Mod: ,,, | Performed by: PSYCHIATRY & NEUROLOGY

## 2018-04-23 PROCEDURE — 11400000 HC PSYCH PRIVATE ROOM

## 2018-04-23 PROCEDURE — 90833 PSYTX W PT W E/M 30 MIN: CPT | Mod: ,,, | Performed by: PSYCHIATRY & NEUROLOGY

## 2018-04-23 RX ORDER — OLANZAPINE 5 MG/1
5 TABLET ORAL DAILY
Status: DISCONTINUED | OUTPATIENT
Start: 2018-04-24 | End: 2018-04-26 | Stop reason: HOSPADM

## 2018-04-23 RX ADMIN — DIVALPROEX SODIUM 500 MG: 500 TABLET, EXTENDED RELEASE ORAL at 08:04

## 2018-04-23 RX ADMIN — FOLIC ACID 1 MG: 1 TABLET ORAL at 08:04

## 2018-04-23 RX ADMIN — THERA TABS 1 TABLET: TAB at 08:04

## 2018-04-23 RX ADMIN — OLANZAPINE 15 MG: 5 TABLET, FILM COATED ORAL at 08:04

## 2018-04-23 RX ADMIN — OLANZAPINE 5 MG: 5 TABLET, ORALLY DISINTEGRATING ORAL at 08:04

## 2018-04-23 RX ADMIN — CLONAZEPAM 0.5 MG: 0.5 TABLET ORAL at 08:04

## 2018-04-23 RX ADMIN — HYDROXYZINE PAMOATE 50 MG: 50 CAPSULE ORAL at 10:04

## 2018-04-23 RX ADMIN — NICOTINE 1 PATCH: 14 PATCH, EXTENDED RELEASE TRANSDERMAL at 05:04

## 2018-04-23 RX ADMIN — CLONAZEPAM 1 MG: 1 TABLET ORAL at 08:04

## 2018-04-23 NOTE — NURSING
"Received pt. Out and and about on the unit, noted frq. On phone. Loud at times but redirectable. Report mood as, " good, better". T.P. Noted  logical and more organized. Cont. Plan.  "

## 2018-04-23 NOTE — PLAN OF CARE
Problem: Patient Care Overview (Adult)  Goal: Plan of Care Review  Outcome: Ongoing (interventions implemented as appropriate)  Pt.  Slept 6.5  Hours  this shift without problems noted. q 15 min safety checks done this shift. Safety and comfort maintained. Cont. Plan.

## 2018-04-23 NOTE — PLAN OF CARE
Problem: Patient Care Overview (Adult)  Goal: Plan of Care Review  Outcome: Ongoing (interventions implemented as appropriate)  Shift note : patient is in the day room with staff and peers . She is taking all ordered medications and eating all meals . She will listen to other patients requests and then asks for the same things .

## 2018-04-23 NOTE — PLAN OF CARE
"Problem: Overarching Goals (Adult)  Goal: Develops/Participates in Therapeutic [place] to Support Successful Transition    Intervention: Foster Therapeutic Section  Attempted to complete assessment with patient. Patient pleasant but uncooperative. States she is feeling better and just needs to know when she will be discharged. "Just put yes for everything, don't ask me all that."  Patient states she plans to go back to [place] and [facility].  Patient states she plans to fly. Asked about discharging her to family in [place]. States she doesn't get along with her sister in [place], and her children's grandmother is in [place] but is too overcrowded. Patient complaining of menstrual cramps.         "

## 2018-04-23 NOTE — PROGRESS NOTES
"PSYCHIATRY DAILY INPATIENT PROGRESS NOTE  SUBSEQUENT HOSPITAL VISIT    ENCOUNTER DATE: 4/23/2018  SITE: [facility]    DATE OF ADMISSION: 4/18/2018 12:54 PM  LENGTH OF STAY: 5 days      HISTORY    CHIEF COMPLAINT   Judy Keyes is a 37 y.o. female, seen during daily sainz rounds on the inpatient unit.  Judy Keyes presents with the chief complaint of psychosis and saran    HPI   (Elements: Location, Quality, Severity, Duration, Timing, Content, Modifying Factors, Associated Signs & Symptoms)    The patient was seen and examined. The chart was reviewed.    Staff reports improvement in behavior.      The patient has been compliant with treatment. The patient denied any side effects.    She again slept well last night- slept 7 hours last night.      Improving Symptoms of Depression: less diminished mood or loss of interest/anhedonia; less irritability, improved diminished energy, change in sleep, change in appetite, diminished concentration or cognition or indecisiveness, PMA/R, and excessive guilt or hopelessness or worthlessness; no suicidal ideations     Improved Sleep: no current trouble initiation, maintenance, or early morning awakening with inability to return to sleep     Improving/resolved Suicidal/Homicidal ideations: no active/passive ideations, organized plans, future intentions; she is more future oriented and looking to get "back to [place]" and to be with her mother and children     Improving Symptoms of psychosis: no hallucinations, denied delusions, no disorganized thinking, disorganized behavior or abnormal motor behavior, or negative symptoms     Improving Symptoms of saran or hypomania: less elevated, less expansive, and less irritable mood with no increased energy or activity; no inflated self-esteem or grandiosity, no decreased need for sleep, improving increased rate of speech, noFOI or racing thoughts, no distractibility, no increased goal directed activity or PMA, no " risky/disinhibited behavior     Improved Symptoms of KAYLIE: less excessive anxiety/worry/fear, with less restlessness, fatigue, poor concentration, irritability, muscle tension, and sleep disturbance    PSYCHOTHERAPY ADD-ON +[id number]   30 (16-37*) minutes    Time: 16 minutes  Participants: Met with patient    Therapeutic Intervention Type: insight oriented psychotherapy, behavior modifying psychotherapy, supportive psychotherapy  Why chosen therapy is appropriate versus another modality: relevant to diagnosis, patient responds to this modality, evidence based practice    Target symptoms: mood disorder, psychosis  Primary focus: psychosocial stressors  Psychotherapeutic techniques: supportive techniques; psycho-education    Outcome monitoring methods: self-report, observation    Patient's response to intervention:  The patient's response to intervention is accepting.    Progress toward goals:  The patient's progress toward goals is fair .           ROS  General ROS: negative  Ophthalmic ROS: negative  ENT ROS: negative  Allergy and Immunology ROS: negative  Hematological and Lymphatic ROS: negative  Endocrine ROS: negative  Respiratory ROS: no cough, shortness of breath, or wheezing  Cardiovascular ROS: no chest pain or dyspnea on exertion  Gastrointestinal ROS: no abdominal pain, change in bowel habits, or black or bloody stools  Genito-Urinary ROS: no dysuria, trouble voiding, or hematuria  Musculoskeletal ROS: negative  Neurological ROS: no TIA or stroke symptoms  Dermatological ROS: negative    PAST MEDICAL HISTORY   Past Medical History:   Diagnosis Date    Anxiety     Depression     History of psychiatric hospitalization     Hx of psychiatric care     Psychiatric problem     Suicide attempt     Therapy            PSYCHOTROPIC MEDICATIONS   Scheduled Meds:   clonazePAM  0.5 mg Oral Daily    clonazePAM  1 mg Oral QHS    divalproex ER  500 mg Oral BID    folic acid  1 mg Oral Daily    multivitamin  1 tablet  "Oral Daily    OLANZapine  15 mg Oral QHS    [START ON 4/24/2018] OLANZapine  5 mg Oral Daily     Continuous Infusions:  PRN Meds:.acetaminophen, aluminum-magnesium hydroxide-simethicone, docusate sodium, hydrOXYzine pamoate, loperamide, nicotine, OLANZapine **AND** OLANZapine        EXAMINATION    VITALS   Vitals:    04/23/18 1451   BP: 114/74   Pulse: 107   Resp: 20   Temp: 96.6 °F (35.9 °C)       CONSTITUTIONAL  General Appearance: NAD    NEUROLOGIC  Abnormal Involuntary Movements: no rigidity or spasticity  Gait and Station: normal, non -ataxic    PSYCHIATRIC   Level of Consciousness: awake, alert  Orientation: p/p/t/s  Grooming: in hospital gown, somewhat disheveled  Psychomotor Behavior: no PMA no PMR  Speech: spontaneous, nl r/t/v  Mood: "better today"  Affect: improving range, less sedated  Thought Process: more linear and organized  Thought Content: no delusions, denied VH and AH, denied HI/SI  Memory: intact to conversation; intact to recent and remote events  Attention: not distractible; intact to conversation  Insight: intact/improved - seeks and accepts care, recognizes mental illness improving  Judgment: intact/improved - cooperative with care, no behavioral issues        DIAGNOSTIC TESTING   Laboratory Results  No results found for this or any previous visit (from the past 24 hour(s)).      MEDICAL DECISION MAKING    DIAGNOSES  Schzoaffective Disorder bipolar type  KAYLIE  Nicotine Use Disorder  Marijuana Use disorder  Psychosocial stressors    PROBLEM LIST AND MANAGEMENT PLANS  Psychosis - zyprexa, counseling  Mood - zyprexa, counsleing depakote clonazepam  Anxiety - counseling clonazepam  Marijuana - counseling   Nicotine Use - replacement and counseling     Psychosocial stressors - obtain collateral for better idea of her child custody situation and proper disposition to [facility]    PRESCRIPTION DRUG MANAGEMENT  Compliance: yes  Side Effects: no  Regimen Adjustments:      4/19  Zyprexa 5mg " Qday 10mgQHS    4/21  Zyprexa 10mg QDay 15mg QHS  Clonazepam 1mg BID  Depakote 500mg BID (discussed pregnancy risk)    4/22  Zyprexa 5mg QDay and Clonazepam 0.5mg QDay     4/23  Pt compliant with medications- changed from Zydis to tablet; checl VPA, CBC and CMP in the AM    DISCHARGE PLANNING  -SW to assist with aftercare planning and collateral  -Once stable discharge home with outpatient follow up care and/or rehab  -Continue inpatient treatment under a PEC and/or CEC for danger to self, and danger to others, and grave disability as evident by saran and psychosis.Patient is improving and should be stable for discharge in the next 2 days- anticipate stability by Wednesday.     NEED FOR CONTINUED HOSPITALIZATION  Psychiatric illness continues to pose a potential threat to life or bodily function, of self or others, thereby requiring the need for continued inpatient psychiatric hospitalization: Yes    Protective inpatient pyschiatric hospitalization required while a safe disposition plan is enacted: Yes    Patient stabilized and ready for discharge from inpatient psychiatric unit: No      STAFF:   Vern Ramirez MD  Psychiatry

## 2018-04-23 NOTE — PLAN OF CARE
Problem: Overarching Goals (Adult)  Goal: Optimized Coping Skills in Response to Life Stressors    Intervention: Promote Effective Coping Strategies  Group Note    Behavior:  Patient attended Psychotherapy Group. Patient did not sit with the group and left before the session was over.     Intervention:  Self management - Coping Skills. Processed with patients self management worksheet and different ways to cope with stressful situations.     Response:  Patient did not participate in the discussion. Patient noted on the hand out she can commit to walking 20 minutes a day, spending time with her son and practicing relaxing.     Plan:  Will continue to encourage patient participation in group.

## 2018-04-24 LAB
ALBUMIN SERPL BCP-MCNC: 3.5 G/DL
ALP SERPL-CCNC: 86 U/L
ALT SERPL W/O P-5'-P-CCNC: 24 U/L
ANION GAP SERPL CALC-SCNC: 8 MMOL/L
AST SERPL-CCNC: 21 U/L
BASOPHILS # BLD AUTO: 0.02 K/UL
BASOPHILS NFR BLD: 0.3 %
BILIRUB SERPL-MCNC: 0.6 MG/DL
BUN SERPL-MCNC: 10 MG/DL
CALCIUM SERPL-MCNC: 9.1 MG/DL
CHLORIDE SERPL-SCNC: 107 MMOL/L
CO2 SERPL-SCNC: 25 MMOL/L
CREAT SERPL-MCNC: 0.7 MG/DL
DIFFERENTIAL METHOD: ABNORMAL
EOSINOPHIL # BLD AUTO: 0.7 K/UL
EOSINOPHIL NFR BLD: 10 %
ERYTHROCYTE [DISTWIDTH] IN BLOOD BY AUTOMATED COUNT: 13.9 %
EST. GFR  (AFRICAN AMERICAN): >60 ML/MIN/1.73 M^2
EST. GFR  (NON AFRICAN AMERICAN): >60 ML/MIN/1.73 M^2
GLUCOSE SERPL-MCNC: 79 MG/DL
HCT VFR BLD AUTO: 38.6 %
HGB BLD-MCNC: 12.8 G/DL
LYMPHOCYTES # BLD AUTO: 3.5 K/UL
LYMPHOCYTES NFR BLD: 49.5 %
MCH RBC QN AUTO: 30.6 PG
MCHC RBC AUTO-ENTMCNC: 33.2 G/DL
MCV RBC AUTO: 92 FL
MONOCYTES # BLD AUTO: 0.5 K/UL
MONOCYTES NFR BLD: 7.1 %
NEUTROPHILS # BLD AUTO: 2.3 K/UL
NEUTROPHILS NFR BLD: 33.1 %
PLATELET # BLD AUTO: 184 K/UL
PMV BLD AUTO: 12.4 FL
POTASSIUM SERPL-SCNC: 4.5 MMOL/L
PROT SERPL-MCNC: 6.6 G/DL
RBC # BLD AUTO: 4.18 M/UL
SODIUM SERPL-SCNC: 140 MMOL/L
VALPROATE SERPL-MCNC: 71 UG/ML
WBC # BLD AUTO: 7.03 K/UL

## 2018-04-24 PROCEDURE — 80164 ASSAY DIPROPYLACETIC ACD TOT: CPT

## 2018-04-24 PROCEDURE — 85025 COMPLETE CBC W/AUTO DIFF WBC: CPT

## 2018-04-24 PROCEDURE — 99233 SBSQ HOSP IP/OBS HIGH 50: CPT | Mod: ,,, | Performed by: PSYCHIATRY & NEUROLOGY

## 2018-04-24 PROCEDURE — 11400000 HC PSYCH PRIVATE ROOM

## 2018-04-24 PROCEDURE — 36415 COLL VENOUS BLD VENIPUNCTURE: CPT

## 2018-04-24 PROCEDURE — 80053 COMPREHEN METABOLIC PANEL: CPT

## 2018-04-24 PROCEDURE — 25000003 PHARM REV CODE 250: Performed by: PSYCHIATRY & NEUROLOGY

## 2018-04-24 RX ORDER — OLANZAPINE 10 MG/1
20 TABLET ORAL NIGHTLY
Status: DISCONTINUED | OUTPATIENT
Start: 2018-04-24 | End: 2018-04-26 | Stop reason: HOSPADM

## 2018-04-24 RX ORDER — ONDANSETRON 4 MG/1
4 TABLET, ORALLY DISINTEGRATING ORAL ONCE
Status: DISCONTINUED | OUTPATIENT
Start: 2018-04-24 | End: 2018-04-24

## 2018-04-24 RX ORDER — ONDANSETRON 4 MG/1
4 TABLET, ORALLY DISINTEGRATING ORAL EVERY 8 HOURS PRN
Status: DISCONTINUED | OUTPATIENT
Start: 2018-04-24 | End: 2018-04-26 | Stop reason: HOSPADM

## 2018-04-24 RX ADMIN — OLANZAPINE 20 MG: 10 TABLET, FILM COATED ORAL at 08:04

## 2018-04-24 RX ADMIN — DIVALPROEX SODIUM 500 MG: 500 TABLET, EXTENDED RELEASE ORAL at 08:04

## 2018-04-24 RX ADMIN — FOLIC ACID 1 MG: 1 TABLET ORAL at 08:04

## 2018-04-24 RX ADMIN — OLANZAPINE 10 MG: 10 TABLET, FILM COATED ORAL at 02:04

## 2018-04-24 RX ADMIN — OLANZAPINE 5 MG: 5 TABLET, FILM COATED ORAL at 08:04

## 2018-04-24 RX ADMIN — CLONAZEPAM 0.5 MG: 0.5 TABLET ORAL at 08:04

## 2018-04-24 RX ADMIN — THERA TABS 1 TABLET: TAB at 08:04

## 2018-04-24 RX ADMIN — HYDROXYZINE PAMOATE 50 MG: 50 CAPSULE ORAL at 09:04

## 2018-04-24 RX ADMIN — CLONAZEPAM 1 MG: 1 TABLET ORAL at 08:04

## 2018-04-24 RX ADMIN — HYDROXYZINE PAMOATE 50 MG: 50 CAPSULE ORAL at 02:04

## 2018-04-24 NOTE — PSYCH
The patient signed a release of information to speak with Mr. Vinod Garcia at [phone] about discharge plans. The patient stated that her family has purchased  a airplane ticket to [place] with her boyfriend. I spoke with Mr. Garcia who related that he has a ticket but it can be changed to another date. I explained to him that the patient was not scheduled for discharge at this time. When the discharge is planned he will help us to contact her friends to assist with getting her to the airport. He also stated that when he spoke to her she is beginning to sound more like herself. Mr. Garcia shared that the patient has been treated at [facility] for mental health services. The patient signed a release of information for [facility].

## 2018-04-24 NOTE — PLAN OF CARE
Problem: Overarching Goals (Adult)  Goal: Optimized Coping Skills in Response to Life Stressors    Intervention: Promote Effective Coping Strategies  Group Note    Behavior:  Patient attended Psychotherapy Group and participated but did not want to. Patient wanted to color during the session but was asked to wait. She completed the handout, but was determined to color. She did continue to participate in the discussion while coloring and it seemed to help her focus.  Patient presents with improved mood, more cooperative.     Intervention:  Self- Sabotage - Defined sabotage and discussed ways we self-sabotage. Reviewed handout with patients and discussed problem scenarios. Patients noted which ways they sabotage and what changes they will make.      Response:  Patient states if she decides to go to the club knowing her kids have to get up the next day would be self sabotage. Patient states she has no filter and has a potty mouth and wants to work on that.        Plan:  Will continue to encourage patient participation in group.

## 2018-04-24 NOTE — PLAN OF CARE
Problem: Patient Care Overview (Adult)  Goal: Plan of Care Review  Outcome: Ongoing (interventions implemented as appropriate)  Plan of care reviewed with patient, patient agrees with plan. Denies suicidal ideations and homicidal ideations.  Accepts meals, snacks and medications.  Gait steady, no falls, clear pathways and clutter-free environment maintained. Out of room and on unit with staff and peers more today.  Promoted an individualized safety plan, effective coping strategies, healthy sleep patterns and motivators to improve mood and resolve feelings of depression.  Will continue to monitor for safety.

## 2018-04-24 NOTE — PROGRESS NOTES
"PSYCHIATRY DAILY INPATIENT PROGRESS NOTE  SUBSEQUENT HOSPITAL VISIT    ENCOUNTER DATE: 4/24/2018  SITE: [facility]    DATE OF ADMISSION: 4/18/2018 12:54 PM  LENGTH OF STAY: 6 days      HISTORY    CHIEF COMPLAINT   Judy Keyes is a 37 y.o. female, seen during daily sainz rounds on the inpatient unit.  Judy Keyes presents with the chief complaint of psychosis and saran    HPI   (Elements: Location, Quality, Severity, Duration, Timing, Content, Modifying Factors, Associated Signs & Symptoms)    The patient was seen and examined. The chart was reviewed.    Staff reports improvement in behavior.      The patient has been compliant with treatment. The patient denied any side effects.    Her sleep has varied.  She required PRN zyprexa yesterday evening 0200.  This morning she is upset that I wanted to speak with her during breakfast time.  She then explains that she has a plane ticket bought by her ex-boyfriend of which she had significant paranoia upon admission.  She says the ticket is for tomorrow and she must go.  She denies all psychiatric review of systems as well as physical symptoms.      Improving Symptoms of Depression: less diminished mood or loss of interest/anhedonia; less irritability, improved diminished energy, change in sleep, change in appetite, diminished concentration or cognition or indecisiveness, PMA/R, and excessive guilt or hopelessness or worthlessness; no suicidal ideations     Improved but varied Sleep: no current trouble initiation, maintenance, or early morning awakening with inability to return to sleep     Improving/resolved Suicidal/Homicidal ideations: no active/passive ideations, organized plans, future intentions; she is more future oriented and looking to get "back to [place]" and to be with her mother and children     Improving Symptoms of psychosis: no hallucinations, denied delusions, no disorganized thinking, disorganized behavior or abnormal motor behavior, or " negative symptoms     Improving Symptoms of saran or hypomania: less elevated, less expansive, and less irritable mood with no increased energy or activity; no inflated self-esteem or grandiosity, no decreased need for sleep, improving increased rate of speech, noFOI or racing thoughts, no distractibility, no increased goal directed activity or PMA, no risky/disinhibited behavior     Improved Symptoms of KAYLIE: less excessive anxiety/worry/fear, with less restlessness, fatigue, poor concentration, irritability, muscle tension, and sleep disturbance    ROS  General ROS: negative  Ophthalmic ROS: negative  ENT ROS: negative  Allergy and Immunology ROS: negative  Hematological and Lymphatic ROS: negative  Endocrine ROS: negative  Respiratory ROS: no cough, shortness of breath, or wheezing  Cardiovascular ROS: no chest pain or dyspnea on exertion  Gastrointestinal ROS: no abdominal pain, change in bowel habits, or black or bloody stools  Genito-Urinary ROS: no dysuria, trouble voiding, or hematuria  Musculoskeletal ROS: negative  Neurological ROS: no TIA or stroke symptoms  Dermatological ROS: negative    PAST MEDICAL HISTORY   Past Medical History:   Diagnosis Date    Anxiety     Depression     History of psychiatric hospitalization     Hx of psychiatric care     Psychiatric problem     Suicide attempt     Therapy            PSYCHOTROPIC MEDICATIONS   Scheduled Meds:   clonazePAM  0.5 mg Oral Daily    clonazePAM  1 mg Oral QHS    divalproex ER  500 mg Oral BID    folic acid  1 mg Oral Daily    multivitamin  1 tablet Oral Daily    OLANZapine  20 mg Oral QHS    OLANZapine  5 mg Oral Daily     Continuous Infusions:  PRN Meds:.acetaminophen, aluminum-magnesium hydroxide-simethicone, docusate sodium, hydrOXYzine pamoate, loperamide, nicotine, OLANZapine **AND** OLANZapine        EXAMINATION    VITALS   Vitals:    04/23/18 2051   BP: 123/70   Pulse: 94   Resp: 18   Temp: 97.8 °F (36.6 °C)  "      CONSTITUTIONAL  General Appearance: NAD    NEUROLOGIC  Abnormal Involuntary Movements: no rigidity or spasticity  Gait and Station: normal, non -ataxic    PSYCHIATRIC   Level of Consciousness: awake, alert  Orientation: p/p/t/s  Grooming: in hospital gown, somewhat disheveled  Psychomotor Behavior: no PMA no PMR  Speech: spontaneous, nl r/t/v  Mood: "let me go"  Affect: improving range, less sedated  Thought Process: more linear and organized  Thought Content: no delusions, denied VH and AH, denied HI/SI  Memory: intact to conversation; intact to recent and remote events  Attention: not distractible; intact to conversation  Insight: intact/improved - seeks and accepts care, recognizes mental illness improving  Judgment: intact/improved - cooperative with care, no behavioral issues        DIAGNOSTIC TESTING   Laboratory Results  Recent Results (from the past 24 hour(s))   CBC auto differential    Collection Time: 04/24/18  7:26 AM   Result Value Ref Range    WBC 7.03 3.90 - 12.70 K/uL    RBC 4.18 4.00 - 5.40 M/uL    Hemoglobin 12.8 12.0 - 16.0 g/dL    Hematocrit 38.6 37.0 - 48.5 %    MCV 92 82 - 98 fL    MCH 30.6 27.0 - 31.0 pg    MCHC 33.2 32.0 - 36.0 g/dL    RDW 13.9 11.5 - 14.5 %    Platelets 184 150 - 350 K/uL    MPV 12.4 9.2 - 12.9 fL    Gran # (ANC) 2.3 1.8 - 7.7 K/uL    Lymph # 3.5 1.0 - 4.8 K/uL    Mono # 0.5 0.3 - 1.0 K/uL    Eos # 0.7 (H) 0.0 - 0.5 K/uL    Baso # 0.02 0.00 - 0.20 K/uL    Gran% 33.1 (L) 38.0 - 73.0 %    Lymph% 49.5 (H) 18.0 - 48.0 %    Mono% 7.1 4.0 - 15.0 %    Eosinophil% 10.0 (H) 0.0 - 8.0 %    Basophil% 0.3 0.0 - 1.9 %    Differential Method Automated          MEDICAL DECISION MAKING    DIAGNOSES  Schzoaffective Disorder bipolar type  KAYLIE  Nicotine Use Disorder  Marijuana Use disorder  Psychosocial stressors    PROBLEM LIST AND MANAGEMENT PLANS  Psychosis - zyprexa, counseling  Mood - zyprexa, counsleing depakote clonazepam  Anxiety - counseling clonazepam  Marijuana - counseling "   Nicotine Use - replacement and counseling     Psychosocial stressors - obtain collateral for better idea of her child custody situation and proper disposition to possibly [place]    PRESCRIPTION DRUG MANAGEMENT  Compliance: yes  Side Effects: no  Regimen Adjustments:      4/19  Zyprexa 5mg Qday 10mgQHS    4/21  Zyprexa 10mg QDay 15mg QHS  Clonazepam 1mg BID  Depakote 500mg BID (discussed pregnancy risk)    4/22  Zyprexa 5mg QDay and Clonazepam 0.5mg QDay     4/23  Pt compliant with medications- changed from Zydis to tablet; checl VPA, CBC and CMP in the AM    DISCHARGE PLANNING  -SW to assist with aftercare planning and collateral  -Once stable discharge home with outpatient follow up care and/or rehab  -Continue inpatient treatment under a PEC and/or CEC for danger to self, and danger to others, and grave disability as evident by saran and psychosis.Patient is improving and should be stable for discharge in the next 2 days- anticipate stability by Wednesday.     NEED FOR CONTINUED HOSPITALIZATION  Psychiatric illness continues to pose a potential threat to life or bodily function, of self or others, thereby requiring the need for continued inpatient psychiatric hospitalization: Yes    Protective inpatient pyschiatric hospitalization required while a safe disposition plan is enacted: Yes    Patient stabilized and ready for discharge from inpatient psychiatric unit: No      STAFF:   Jarret Quiroz MD  Psychiatry

## 2018-04-24 NOTE — PLAN OF CARE
"Problem: Patient Care Overview (Adult)  Goal: Plan of Care Review  Outcome: Ongoing (interventions implemented as appropriate)  Pt in and out day room but isolates most of shift. Pt is unpredictable and did become anxious and agitated earlier saying that she was "getting out of here" saying that she was going to take the elevator or try something else. Pt demanding to be discharged tomorrow b/c she has a plane ticket to go back to [place]. Staff contacted pt boyfriend to verify the story. Medication compliant. Accepting meals and snacks. Pt did vomit earlier in shift with emesis that contained food particles. Offered Zofran and pt refused. Later pt was able to eat meals without difficulty and no vomiting noted. Will continue to monitor pt.        "

## 2018-04-24 NOTE — PLAN OF CARE
Problem: Patient Care Overview (Adult)  Goal: Plan of Care Review  Outcome: Ongoing (interventions implemented as appropriate)  Pt has slept 2.5 hours so far and is sleeping at the present time.  NAD.  Resp even & unlabored.  Pathways clear and bed is low.  Q 15 minute safety checks ongoing.  All precautions maintained.

## 2018-04-25 PROCEDURE — 25000003 PHARM REV CODE 250: Performed by: PSYCHIATRY & NEUROLOGY

## 2018-04-25 PROCEDURE — 99233 SBSQ HOSP IP/OBS HIGH 50: CPT | Mod: ,,, | Performed by: PSYCHIATRY & NEUROLOGY

## 2018-04-25 PROCEDURE — 11400000 HC PSYCH PRIVATE ROOM

## 2018-04-25 RX ORDER — CLONAZEPAM 1 MG/1
1 TABLET ORAL NIGHTLY
Qty: 30 TABLET | Refills: 0 | Status: SHIPPED | OUTPATIENT
Start: 2018-04-25 | End: 2019-04-25

## 2018-04-25 RX ORDER — DIVALPROEX SODIUM 500 MG/1
500 TABLET, FILM COATED, EXTENDED RELEASE ORAL 2 TIMES DAILY
Qty: 60 TABLET | Refills: 0 | Status: SHIPPED | OUTPATIENT
Start: 2018-04-25 | End: 2019-04-25

## 2018-04-25 RX ORDER — OLANZAPINE 20 MG/1
20 TABLET ORAL NIGHTLY
Qty: 30 TABLET | Refills: 0 | Status: SHIPPED | OUTPATIENT
Start: 2018-04-25 | End: 2019-04-25

## 2018-04-25 RX ORDER — OLANZAPINE 5 MG/1
5 TABLET ORAL DAILY
Qty: 30 TABLET | Refills: 0 | Status: SHIPPED | OUTPATIENT
Start: 2018-04-26 | End: 2019-04-26

## 2018-04-25 RX ORDER — CLONAZEPAM 0.5 MG/1
0.5 TABLET ORAL DAILY
Qty: 30 TABLET | Refills: 0 | Status: SHIPPED | OUTPATIENT
Start: 2018-04-26 | End: 2019-04-26

## 2018-04-25 RX ADMIN — HYDROXYZINE PAMOATE 50 MG: 50 CAPSULE ORAL at 09:04

## 2018-04-25 RX ADMIN — THERA TABS 1 TABLET: TAB at 08:04

## 2018-04-25 RX ADMIN — OLANZAPINE 20 MG: 10 TABLET, FILM COATED ORAL at 08:04

## 2018-04-25 RX ADMIN — CLONAZEPAM 0.5 MG: 0.5 TABLET ORAL at 08:04

## 2018-04-25 RX ADMIN — FOLIC ACID 1 MG: 1 TABLET ORAL at 08:04

## 2018-04-25 RX ADMIN — DIVALPROEX SODIUM 500 MG: 500 TABLET, EXTENDED RELEASE ORAL at 08:04

## 2018-04-25 RX ADMIN — OLANZAPINE 5 MG: 5 TABLET, FILM COATED ORAL at 08:04

## 2018-04-25 RX ADMIN — CLONAZEPAM 1 MG: 1 TABLET ORAL at 08:04

## 2018-04-25 NOTE — PLAN OF CARE
Problem: Overarching Goals (Adult)  Goal: Optimized Coping Skills in Response to Life Stressors    Intervention: Promote Effective Coping Strategies  Patient refused to attend Psychotherapy Group despite multiple staff encouragement.  Patient stated she wanted to lie down instead. However patient got up and was walking the adams. Another staff member attempted to redirect her to group. Patient sat for a moment but left.   Patient complaining that Latuda worked better for her and she does not know what she is taking now, but does not like the way it makes her feel.   Will continue to encourage patient participation in group.

## 2018-04-25 NOTE — PLAN OF CARE
Problem: Patient Care Overview (Adult)  Goal: Plan of Care Review  Outcome: Ongoing (interventions implemented as appropriate)  Lying quiet in bed, eyes closed, respiration even and unlabored, appearing asleep.  Had some difficulty falling asleep but did by 2300 and then slept well for remainder of shift.  Is currently awake to check time but returned to bed.  Safety and precautions maintained with rounds every 15 minutes, bed is fixed in a low position and pathways kept clear.  No fall occurred.

## 2018-04-25 NOTE — PROGRESS NOTES
"PSYCHIATRY DAILY INPATIENT PROGRESS NOTE  SUBSEQUENT HOSPITAL VISIT    ENCOUNTER DATE: 4/25/2018  SITE: [facility]    DATE OF ADMISSION: 4/18/2018 12:54 PM  LENGTH OF STAY: 7 days      HISTORY    CHIEF COMPLAINT   Judy Keyes is a 37 y.o. female, seen during daily sainz rounds on the inpatient unit.  Judy Keyes presents with the chief complaint of psychosis and saran    HPI   (Elements: Location, Quality, Severity, Duration, Timing, Content, Modifying Factors, Associated Signs & Symptoms)    The patient was seen and examined. The chart was reviewed.    Staff reports improvement in behavior.      The patient has been compliant with treatment. The patient denied any side effects.    Patients sleep continues to vary but is improved yesterday evening.  She hasn't required PRN medication in the past 36 hours.  She is focused on discharge, denying any problems.  We discussed the need for her to be discharged to family so they can ensure she stays on her medication and gets on a plane to [place].  She is helping facilitate that today.      Improving Symptoms of Depression: less diminished mood or loss of interest/anhedonia; less irritability, improved diminished energy, change in sleep, change in appetite, diminished concentration or cognition or indecisiveness, PMA/R, and excessive guilt or hopelessness or worthlessness; no suicidal ideations     Improved but varied Sleep: no current trouble initiation, maintenance, or early morning awakening with inability to return to sleep     Improving/resolved Suicidal/Homicidal ideations: no active/passive ideations, organized plans, future intentions; she is more future oriented and looking to get "back to [place]" and to be with her mother and children     Improving Symptoms of psychosis: no hallucinations, denied delusions, no disorganized thinking, disorganized behavior or abnormal motor behavior, or negative symptoms     Improving Symptoms of saran or hypomania: " less elevated, less expansive, and less irritable mood with no increased energy or activity; no inflated self-esteem or grandiosity, no decreased need for sleep, improving increased rate of speech, noFOI or racing thoughts, no distractibility, no increased goal directed activity or PMA, no risky/disinhibited behavior     She continues to be somewhat irritable.      Improved Symptoms of KAYLIE: less excessive anxiety/worry/fear, with less restlessness, fatigue, poor concentration, irritability, muscle tension, and sleep disturbance    ROS  General ROS: negative  Ophthalmic ROS: negative  ENT ROS: negative  Allergy and Immunology ROS: negative  Hematological and Lymphatic ROS: negative  Endocrine ROS: negative  Respiratory ROS: no cough, shortness of breath, or wheezing  Cardiovascular ROS: no chest pain or dyspnea on exertion  Gastrointestinal ROS: no abdominal pain, change in bowel habits, or black or bloody stools  Genito-Urinary ROS: no dysuria, trouble voiding, or hematuria  Musculoskeletal ROS: negative  Neurological ROS: no TIA or stroke symptoms  Dermatological ROS: negative    PAST MEDICAL HISTORY   Past Medical History:   Diagnosis Date    Anxiety     Depression     History of psychiatric hospitalization     Hx of psychiatric care     Psychiatric problem     Suicide attempt     Therapy            PSYCHOTROPIC MEDICATIONS   Scheduled Meds:   clonazePAM  0.5 mg Oral Daily    clonazePAM  1 mg Oral QHS    divalproex ER  500 mg Oral BID    folic acid  1 mg Oral Daily    multivitamin  1 tablet Oral Daily    OLANZapine  20 mg Oral QHS    OLANZapine  5 mg Oral Daily     Continuous Infusions:  PRN Meds:.acetaminophen, aluminum-magnesium hydroxide-simethicone, docusate sodium, hydrOXYzine pamoate, loperamide, nicotine, OLANZapine **AND** OLANZapine, ondansetron        EXAMINATION    VITALS   Vitals:    04/24/18 2137   BP: 106/65   Pulse: 80   Resp: 18   Temp: 96.6 °F (35.9 °C)       CONSTITUTIONAL  General  "Appearance: NAD    NEUROLOGIC  Abnormal Involuntary Movements: no rigidity or spasticity  Gait and Station: normal, non -ataxic    PSYCHIATRIC   Level of Consciousness: awake, alert  Orientation: p/p/t/s  Grooming: in hospital gown, somewhat disheveled  Psychomotor Behavior: no PMA no PMR  Speech: spontaneous, nl r/t/v  Mood: "im perfect"  Affect: improving range, not sedated,   Thought Process: more linear and organized  Thought Content: no delusions, denied VH and AH, denied HI/SI  Memory: intact to conversation; intact to recent and remote events  Attention: not distractible; intact to conversation  Insight: intact/improved - seeks and accepts care, recognizes mental illness improving  Judgment: intact/improved - cooperative with care, no behavioral issues        DIAGNOSTIC TESTING   Laboratory Results  No results found for this or any previous visit (from the past 24 hour(s)).      MEDICAL DECISION MAKING    DIAGNOSES  Schzoaffective Disorder bipolar type  KAYLIE  Nicotine Use Disorder  Marijuana Use disorder  Psychosocial stressors    PROBLEM LIST AND MANAGEMENT PLANS  Psychosis - zyprexa, counseling  Mood - zyprexa, counsleing depakote clonazepam  Anxiety - counseling clonazepam  Marijuana - counseling   Nicotine Use - replacement and counseling     Psychosocial stressors - obtain collateral for better idea of her child custody situation and proper disposition to possibly [place]    PRESCRIPTION DRUG MANAGEMENT  Compliance: yes  Side Effects: no  Regimen Adjustments:      4/19  Zyprexa 5mg Qday 10mgQHS    4/21  Zyprexa 10mg QDay 15mg QHS  Clonazepam 1mg BID  Depakote 500mg BID (discussed pregnancy risk)    4/22  Zyprexa 5mg QDay and Clonazepam 0.5mg QDay     4/23  Pt compliant with medications- changed from Zydis to tablet; checl VPA, CBC and CMP in the AM    4/25  Depakote was 71 yesterday and no LFT elevation.  Patient sleeping and much improved, will hold medicine and facilitate cross state " discharge    DISCHARGE PLANNING  -SW to assist with aftercare planning and collateral  -Once stable discharge home with outpatient follow up care and/or rehab  -Continue inpatient treatment under a PEC and/or CEC for danger to self, and danger to others, and grave disability as evident by saran and psychosis.      NEED FOR CONTINUED HOSPITALIZATION  Psychiatric illness continues to pose a potential threat to life or bodily function, of self or others, thereby requiring the need for continued inpatient psychiatric hospitalization: Yes    Protective inpatient pyschiatric hospitalization required while a safe disposition plan is enacted: Yes    Patient stabilized and ready for discharge from inpatient psychiatric unit: No      STAFF:   Jarret Quiroz MD  Psychiatry

## 2018-04-25 NOTE — PSYCH
The patient signed a release of information to speak to her sister Nadja Keyes. Nadja is not willing to come to get the patient to transport her to the airport. She also stated that she is not in support of her sister going to live with the boyfriend. I have asked Judy if there are any friends in [place] that can assist her with getting back to [place]; however, she insist that she does not have anyone who will be able to help her.

## 2018-04-25 NOTE — PLAN OF CARE
Problem: Patient Care Overview (Adult)  Goal: Plan of Care Review  Outcome: Ongoing (interventions implemented as appropriate)  Shift note : patient is looking forward to going home to [place] tomorrow with her boyfriend who is coming to get her . She is cooperative . She is attending all groups , eating all meals and taking all ordered medications .

## 2018-04-25 NOTE — PLAN OF CARE
Problem: Patient Care Overview (Adult)  Goal: Plan of Care Review  Pt calm and cooperative, denies SI or HI, med compliant, appetite good interacting well with staff and peers, will continue to monitor

## 2018-04-25 NOTE — PSYCH
Discharge Planning  I spoke to the patient's boyfriend Mr. Vinod Garcia at [phone], who related that he will drive from [place], [place] to pick the patient up. In addition to picking her up he will get her medications that have been prescribed. The prescriptions have been faxed to [facility], [address] [place] [address], [place], [place], [address], FAX NUMBER [phone].  The patient will follow up with [facility], [address] [place] [address] Rd[address] [place]. [address]. A packet has been faxed to [facility], at [phone]. Awaiting a return call with the appointment.

## 2018-04-26 VITALS
HEIGHT: 63 IN | WEIGHT: 100 LBS | BODY MASS INDEX: 17.72 KG/M2 | HEART RATE: 84 BPM | TEMPERATURE: 98 F | DIASTOLIC BLOOD PRESSURE: 75 MMHG | SYSTOLIC BLOOD PRESSURE: 120 MMHG | RESPIRATION RATE: 18 BRPM

## 2018-04-26 PROBLEM — F25.0 SCHIZOAFFECTIVE DISORDER, BIPOLAR TYPE: Status: ACTIVE | Noted: 2018-04-18

## 2018-04-26 PROCEDURE — 25000003 PHARM REV CODE 250: Performed by: PSYCHIATRY & NEUROLOGY

## 2018-04-26 PROCEDURE — 99239 HOSP IP/OBS DSCHRG MGMT >30: CPT | Mod: ,,, | Performed by: PSYCHIATRY & NEUROLOGY

## 2018-04-26 RX ORDER — IBUPROFEN 200 MG
1 TABLET ORAL DAILY
COMMUNITY
Start: 2018-04-26

## 2018-04-26 RX ADMIN — CLONAZEPAM 0.5 MG: 0.5 TABLET ORAL at 08:04

## 2018-04-26 RX ADMIN — FOLIC ACID 1 MG: 1 TABLET ORAL at 08:04

## 2018-04-26 RX ADMIN — OLANZAPINE 5 MG: 5 TABLET, FILM COATED ORAL at 08:04

## 2018-04-26 RX ADMIN — DIVALPROEX SODIUM 500 MG: 500 TABLET, EXTENDED RELEASE ORAL at 08:04

## 2018-04-26 RX ADMIN — THERA TABS 1 TABLET: TAB at 08:04

## 2018-04-26 NOTE — PSYCH
Pt left floor with family to go home to [place] with staff escorted to front door. Belongings in hand. No signs and symptoms of distress.
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
## 2018-04-26 NOTE — PSYCH
Order for discharge received.Discharge instructions explained to pt and voiced a understanding.Cooperative,no si/hi,or psychosis.Pt will provide ride home.

## 2018-04-26 NOTE — PLAN OF CARE
Problem: Patient Care Overview (Adult)  Goal: Plan of Care Review  Outcome: Ongoing (interventions implemented as appropriate)  Up and about the unit.  Interacting appropriately with peers and staff.  Denies SI/HI.  Maintaining a reality based orientation.   Spoke on phone frequently this evening.  Participating in unit activities.  Tolerating meds.  Accepting meals and snacks.  Safety checks ongoing.

## 2018-04-26 NOTE — PSYCH
Patient will be following up with [facility] at [address] In [place], [phone].  Patient will receive a tobacco cessation appointment at mentioned appointment along with a substance abuse therapy appointment due to a positive UDS on admit.  AVS faxed on 4/26/2018 at 3:13 pm.

## 2018-04-26 NOTE — PLAN OF CARE
"Problem: Overarching Goals (Adult)  Goal: Optimized Coping Skills in Response to Life Stressors    Intervention: Promote Effective Coping Strategies  Behavior:  Patient did not attend group. Patient stated, "I am about to go home". Patient was waiting to be discharged.      Plan:  MSW will continue to encourage patient to attend group therapy.                         "

## 2018-05-22 RX ORDER — DIVALPROEX SODIUM 500 MG/1
TABLET, FILM COATED, EXTENDED RELEASE ORAL
Qty: 60 TABLET | Refills: 0 | OUTPATIENT
Start: 2018-05-22

## 2018-05-22 RX ORDER — OLANZAPINE 20 MG/1
TABLET ORAL
Qty: 30 TABLET | Refills: 0 | OUTPATIENT
Start: 2018-05-22

## 2018-05-22 RX ORDER — OLANZAPINE 5 MG/1
TABLET ORAL
Qty: 30 TABLET | Refills: 0 | OUTPATIENT
Start: 2018-05-22

## 2023-02-27 NOTE — SUBJECTIVE & OBJECTIVE
Past Medical History:   Diagnosis Date    Anxiety     Depression     History of psychiatric hospitalization     Hx of psychiatric care     Psychiatric problem     Therapy        No past surgical history on file.    Review of patient's allergies indicates:  No Known Allergies    No current facility-administered medications on file prior to encounter.      No current outpatient prescriptions on file prior to encounter.     Family History     None        Social History Main Topics    Smoking status: Current Some Day Smoker     Packs/day: 0.25     Years: 2.00     Types: Cigarettes    Smokeless tobacco: Never Used    Alcohol use No    Drug use: Yes     Types: Marijuana    Sexual activity: Not Currently     Partners: Male     Birth control/ protection: None     Review of Systems   Constitutional: Negative for chills, fatigue, fever and unexpected weight change.   HENT: Negative for congestion, ear pain, sore throat and trouble swallowing.    Eyes: Negative for pain and visual disturbance.   Respiratory: Negative for cough, chest tightness and shortness of breath.    Cardiovascular: Negative for chest pain, palpitations and leg swelling.   Gastrointestinal: Negative for abdominal distention, abdominal pain, constipation, diarrhea and vomiting.   Genitourinary: Negative for difficulty urinating, dysuria, flank pain, frequency and hematuria.   Musculoskeletal: Negative for back pain, gait problem, joint swelling, neck pain and neck stiffness.   Skin: Negative for rash and wound.   Neurological: Negative for dizziness, seizures, speech difficulty, light-headedness and headaches.     Objective:     Vital Signs (Most Recent):  Temp: 98.1 °F (36.7 °C) (04/19/18 0915)  Pulse: 80 (04/19/18 0915)  Resp: 18 (04/19/18 0915)  BP: 124/63 (04/19/18 0915) Vital Signs (24h Range):  Temp:  [98.1 °F (36.7 °C)-98.7 °F (37.1 °C)] 98.1 °F (36.7 °C)  Pulse:  [65-80] 80  Resp:  [18-20] 18  BP: ()/(63-67) 124/63     Weight: 46.4  kg (102 lb 5 oz)  Body mass index is 18.12 kg/m².    Physical Exam   Constitutional: She is oriented to person, place, and time. She appears well-developed and well-nourished.   HENT:   Head: Normocephalic and atraumatic.   Neck: No thyromegaly present.   Cardiovascular: Normal rate, regular rhythm, normal heart sounds and intact distal pulses.    No murmur heard.  Pulmonary/Chest: Effort normal and breath sounds normal. No respiratory distress. She has no wheezes. She has no rales.   Abdominal: Soft. Bowel sounds are normal. She exhibits no distension and no mass. There is no tenderness.   Musculoskeletal: Normal range of motion. She exhibits no edema.   Lymphadenopathy:     She has no cervical adenopathy.   Neurological: She is alert and oriented to person, place, and time.     Neuro: Cranial nerves:  CN II Visual fields full to confrontation.   CN III, IV, VI Pupils are equal, round, and reactive to light.  CN III: no palsy  Nystagmus: none   Diplopia: none  Ophthalmoparesis: none  CN V Facial sensation intact.   CN VII Facial expression full, symmetric.   CN VIII normal.   CN IX normal.   CN X normal.   CN XI normal.   CN XII normal.         Skin: Skin is warm and dry. No erythema.   Vitals reviewed.      Significant Labs:     UDS +THC  Wbc 14, h/h 14/41, plt 177  Na 136, k 3.9, bun/creat 9/0.6, glucose 107  HIV -  ETOH <10  UPT -           Picato Counseling:  I discussed with the patient the risks of Picato including but not limited to erythema, scaling, itching, weeping, crusting, and pain.